# Patient Record
Sex: MALE | Race: WHITE | NOT HISPANIC OR LATINO | URBAN - METROPOLITAN AREA
[De-identification: names, ages, dates, MRNs, and addresses within clinical notes are randomized per-mention and may not be internally consistent; named-entity substitution may affect disease eponyms.]

---

## 2017-12-21 ENCOUNTER — OFFICE VISIT - RIVER FALLS (OUTPATIENT)
Dept: FAMILY MEDICINE | Facility: CLINIC | Age: 47
End: 2017-12-21

## 2017-12-21 ASSESSMENT — MIFFLIN-ST. JEOR: SCORE: 2174.52

## 2022-02-12 VITALS
WEIGHT: 295 LBS | HEIGHT: 69 IN | HEART RATE: 84 BPM | TEMPERATURE: 97.3 F | DIASTOLIC BLOOD PRESSURE: 80 MMHG | OXYGEN SATURATION: 95 % | BODY MASS INDEX: 43.69 KG/M2 | SYSTOLIC BLOOD PRESSURE: 136 MMHG

## 2022-02-16 NOTE — PROGRESS NOTES
Patient:   KIMI WALDRON            MRN: [id number]            FIN: [id number]               Age:   47 years     Sex:  Male     :  1970   Associated Diagnoses:   Acute maxillary sinusitis   Author:   Jame Whitley MD      Chief Complaint   2017 10:19 AM CST  c/o sinus congestion & pressure; productive cough x 1 week      History of Present Illness             The patient presents with sinus problem.  The sinus problem is located in the frontal sinus and maxillary sinus.  The sinus problem is characterized by nasal congestion, headache and facial pain.  The severity of the sinus problem is severe.  The sinus problem is constant.  The sinus problem has lasted for symptoms started 10 days ago, felt like it was improving, then worsened 5 days ago.  Associated symptoms consist of cough, ear pain, sore throat and denies fever.     [facility]. NO other concerns today      Health Status   Allergies:    Allergic Reactions (All)  No Known Medication Allergies  Canceled/Inactive Reactions (All)  Severity Not Documented  No known allergies (No reactions were documented)   Problem list:    All Problems  Obesity, Unspecified / ICD-9-.00 / Probable  Personal History of Pneumonia (Recurrent) / ICD-9-CM V12.61 / Confirmed  Tobacco Use Disorder / ICD-9-.1 / Probable      Histories   Past Medical History:    No active or resolved past medical history items have been selected or recorded.   Family History:    No family history items have been selected or recorded.   Procedure history:    No active procedure history items have been selected or recorded.   Social History:             No active social history items have been recorded.      Physical Examination   Vital Signs   2017 10:19 AM CST Temperature Tympanic 97.3 DegF  LOW    Peripheral Pulse Rate 84 bpm    Pulse Site Radial artery    HR Method Electronic    Systolic Blood Pressure 136 mmHg  HI    Diastolic Blood Pressure  80 mmHg    Mean Arterial Pressure 99 mmHg    BP Site Right arm    BP Method Manual    Oxygen Saturation 95 %      Measurements from flowsheet : Measurements   12/21/2017 10:19 AM CST Height Measured - Standard 68.75 in    Weight Measured - Standard 295 lb    BSA 2.55 m2    Body Mass Index 43.88 kg/m2      General:  Alert and oriented, No acute distress.    Eye:  Pupils are equal, round and reactive to light, Normal conjunctiva.    HENT:  Normocephalic, Tympanic membranes are clear, Oral mucosa is moist, No pharyngeal erythema.         Sinus: Bilateral, Maxillary sinus, Tenderness.    Neck:  Supple, Non-tender.    Respiratory:  Lungs are clear to auscultation.    Cardiovascular:  Normal rate, Regular rhythm.       Impression and Plan   Diagnosis     Acute maxillary sinusitis ([id number] J01.00).     Course:  Worsening.    Plan:  Signs and symptoms and over the counter treatment of congestion discussed.  Should resolve over 5-7 days from start of antibiotic.  Return to clinic if severe headache, difficulty swallowing, chest pain, or if symptoms become more severe or any other concerns.  Call with questions..    Orders     Orders   Pharmacy:  amoxicillin 500 mg oral capsule (Prescribe): 2 cap(s) ( 1,000 mg ), po, bid, x 10 day(s), # 40 cap(s), 0 Refill(s), Type: Maintenance, [id number], 2 cap(s) po bid,x10 day(s).